# Patient Record
Sex: FEMALE | Race: WHITE
[De-identification: names, ages, dates, MRNs, and addresses within clinical notes are randomized per-mention and may not be internally consistent; named-entity substitution may affect disease eponyms.]

---

## 2019-08-18 ENCOUNTER — HOSPITAL ENCOUNTER (EMERGENCY)
Dept: HOSPITAL 74 - JERFT | Age: 49
Discharge: HOME | End: 2019-08-18
Payer: COMMERCIAL

## 2019-08-18 VITALS — HEART RATE: 96 BPM | SYSTOLIC BLOOD PRESSURE: 138 MMHG | DIASTOLIC BLOOD PRESSURE: 89 MMHG | TEMPERATURE: 98.4 F

## 2019-08-18 VITALS — BODY MASS INDEX: 31.8 KG/M2

## 2019-08-18 DIAGNOSIS — T31.0: ICD-10-CM
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
## 2019-08-18 DIAGNOSIS — T24.211A: Primary | ICD-10-CM

## 2019-08-18 DIAGNOSIS — T25.191A: ICD-10-CM

## 2019-08-18 PROCEDURE — 2W2LX4Z DRESSING OF RIGHT LOWER EXTREMITY USING BANDAGE: ICD-10-PCS

## 2019-08-18 PROCEDURE — 3E0234Z INTRODUCTION OF SERUM, TOXOID AND VACCINE INTO MUSCLE, PERCUTANEOUS APPROACH: ICD-10-PCS
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
## 2020-06-25 ENCOUNTER — RESULT REVIEW (OUTPATIENT)
Age: 50
End: 2020-06-25

## 2022-10-07 PROBLEM — Z00.00 ENCOUNTER FOR PREVENTIVE HEALTH EXAMINATION: Status: ACTIVE | Noted: 2022-10-07

## 2022-10-11 ENCOUNTER — NON-APPOINTMENT (OUTPATIENT)
Age: 52
End: 2022-10-11

## 2022-10-11 DIAGNOSIS — Z87.11 PERSONAL HISTORY OF PEPTIC ULCER DISEASE: ICD-10-CM

## 2022-10-11 DIAGNOSIS — Z87.39 PERSONAL HISTORY OF OTHER DISEASES OF THE MUSCULOSKELETAL SYSTEM AND CONNECTIVE TISSUE: ICD-10-CM

## 2022-10-11 DIAGNOSIS — Z86.79 PERSONAL HISTORY OF OTHER DISEASES OF THE CIRCULATORY SYSTEM: ICD-10-CM

## 2022-10-11 DIAGNOSIS — Z86.39 PERSONAL HISTORY OF OTHER ENDOCRINE, NUTRITIONAL AND METABOLIC DISEASE: ICD-10-CM

## 2022-10-11 RX ORDER — SIMVASTATIN 20 MG/1
20 TABLET, FILM COATED ORAL
Refills: 0 | Status: ACTIVE | COMMUNITY

## 2022-10-12 ENCOUNTER — APPOINTMENT (OUTPATIENT)
Dept: SURGERY | Facility: CLINIC | Age: 52
End: 2022-10-12

## 2022-10-12 VITALS
SYSTOLIC BLOOD PRESSURE: 137 MMHG | BODY MASS INDEX: 36.32 KG/M2 | RESPIRATION RATE: 18 BRPM | HEART RATE: 84 BPM | WEIGHT: 185 LBS | DIASTOLIC BLOOD PRESSURE: 94 MMHG | TEMPERATURE: 97.1 F | HEIGHT: 60 IN

## 2022-10-12 DIAGNOSIS — Z78.9 OTHER SPECIFIED HEALTH STATUS: ICD-10-CM

## 2022-10-12 DIAGNOSIS — E04.1 NONTOXIC SINGLE THYROID NODULE: ICD-10-CM

## 2022-10-12 DIAGNOSIS — Z83.49 FAMILY HISTORY OF OTHER ENDOCRINE, NUTRITIONAL AND METABOLIC DISEASES: ICD-10-CM

## 2022-10-12 PROCEDURE — 99204 OFFICE O/P NEW MOD 45 MIN: CPT

## 2022-10-12 NOTE — REVIEW OF SYSTEMS
[Feeling Tired] : feeling tired [As Noted in HPI] : as noted in HPI [Negative] : Psychiatric [Sore Throat] : no sore throat [Hoarseness] : no hoarseness [FreeTextEntry8] : h/o kidney stones [de-identified] : hair loss

## 2022-10-12 NOTE — ASSESSMENT
[FreeTextEntry1] : 51 yo F with two suspicious right thyroid isthmus nodules with FNA showing atypia and thyroseq with BRAF mutation conferring a  99% risk of thyroid malignancy.\par \par We had a long discussion about thyroid anatomy, physiology and pathophysiology. We discussed the role of fine needle aspiration biopsies, their interpretation and management. We discussed genny and total thyroidectomy. The patient prefers a total thyroidectomy. We discussed the benefits and potential risks of surgery including temporary and permanent hypoparathyroidism as well as temporary and permanent recurrent laryngeal nerve palsy, bleeding and infection. We discussed the use of intraoperative nerve monitoring. We also spoke about the need for lifelong thyroid replacement therapy postoperatively and temporary calcium supplementation postoperatively. WE discussed the possible need for HENSLEY postop as well as surveillance for recurrence with US and TG. We discussed the expected recovery.\par Surgery will be performed at [facility] under general anesthesia with nerve monitoring as an overnight procedure.\par The patient will require preoperative medical clearance.\par Tentatively planned for Thursday Oct 20, 2022.\par

## 2022-10-12 NOTE — PHYSICAL EXAM
[Respiratory Effort] : normal respiratory effort [Normal Rate and Rhythm] : normal rate and rhythm [No Rash or Lesion] : No rash or lesion [Alert] : alert [Calm] : calm [de-identified] : NAD, well-appearing [de-identified] : Anicteric [de-identified] : prominent thyroid isthmus with nodule palpable at right isthmus border on palpation; On bedside US, there is a hypoechoic smaller nodule and a larger adjacent hypoechoic nodule c/w prior US. There is a visible interface at the tracheal border with possible extension vs inflammatory reaction at overlying muscle focally; no suspicious lateral nodes. [de-identified] : soft, nondistended

## 2022-10-12 NOTE — CONSULT LETTER
[Dear  ___] : Dear  [unfilled], [Courtesy Letter:] : I had the pleasure of seeing your patient, [unfilled], in my office today. [Please see my note below.] : Please see my note below. [Consult Closing:] : Thank you very much for allowing me to participate in the care of this patient.  If you have any questions, please do not hesitate to contact me. [Sincerely,] : Sincerely, [FreeTextEntry3] : Jolynn Del Castillo MD

## 2022-10-12 NOTE — HISTORY OF PRESENT ILLNESS
[de-identified] : 51 yo F presents for second opinion regarding thyroid nodules. She was initially recommended to have a thyroid US about 1 year ago. She didn't end up having it at that time but at her yearly physical this year, did go ahead with the thyroid US. She was found to have multiple bilateral tiny 3-4mm cystic nodules as well as a hypoechoic isthmus nodule [id number] and a second adjacent hypoechoic right isthmus nodule 1.1x0.5x0.9cm. The larger nodule was recommended for FNA and this returned as atypia of undetermined significance, [employer]. Thyroseq from the sample was found to have BRAF [id number] mutation. No lymphadenopathy was noted on the US. She was seen by Dr. Jose Hewitt who discussed with her surgical options including right and total thyroidectomy.\par The patient denies any changes in her voice, dysphagia, shortness of breath or pressure on the neck. Her mother and sister have thyroid issues (hypothyroidism, benign nodule) but no history of thyroid cancer. Her maternal aunt did have thyroid cancer. She denies any history of radiation exposure. She is not a professional cooley.\par Of note, she has a history of calcium/uric acid kidney stones. She was never known to have elevated calcium. Her TSH is normal.

## 2022-10-19 ENCOUNTER — NON-APPOINTMENT (OUTPATIENT)
Age: 52
End: 2022-10-19

## 2022-10-20 ENCOUNTER — RESULT REVIEW (OUTPATIENT)
Age: 52
End: 2022-10-20

## 2022-10-20 ENCOUNTER — APPOINTMENT (OUTPATIENT)
Dept: SURGERY | Facility: HOSPITAL | Age: 52
End: 2022-10-20

## 2022-10-21 ENCOUNTER — RESULT REVIEW (OUTPATIENT)
Age: 52
End: 2022-10-21

## 2022-11-02 ENCOUNTER — APPOINTMENT (OUTPATIENT)
Dept: SURGERY | Facility: CLINIC | Age: 52
End: 2022-11-02

## 2022-11-02 ENCOUNTER — NON-APPOINTMENT (OUTPATIENT)
Age: 52
End: 2022-11-02

## 2022-11-02 VITALS
BODY MASS INDEX: 36.32 KG/M2 | WEIGHT: 185 LBS | SYSTOLIC BLOOD PRESSURE: 142 MMHG | DIASTOLIC BLOOD PRESSURE: 92 MMHG | TEMPERATURE: 98.2 F | HEART RATE: 80 BPM | HEIGHT: 60 IN

## 2022-11-02 DIAGNOSIS — C73 MALIGNANT NEOPLASM OF THYROID GLAND: ICD-10-CM

## 2022-11-02 PROCEDURE — 99024 POSTOP FOLLOW-UP VISIT: CPT

## 2022-11-02 RX ORDER — LISINOPRIL AND HYDROCHLOROTHIAZIDE TABLETS 10; 12.5 MG/1; MG/1
TABLET ORAL
Refills: 0 | Status: ACTIVE | COMMUNITY

## 2022-11-02 RX ORDER — LEVOTHYROXINE SODIUM 0.15 MG/1
150 TABLET ORAL
Qty: 30 | Refills: 0 | Status: ACTIVE | COMMUNITY
Start: 2022-10-21

## 2022-11-04 LAB
ANION GAP SERPL CALC-SCNC: 11 MMOL/L
BUN SERPL-MCNC: 15 MG/DL
CALCIUM SERPL-MCNC: 8.6 MG/DL
CALCIUM SERPL-MCNC: 8.6 MG/DL
CHLORIDE SERPL-SCNC: 102 MMOL/L
CO2 SERPL-SCNC: 27 MMOL/L
CREAT SERPL-MCNC: 0.84 MG/DL
EGFR: 85 ML/MIN/1.73M2
GLUCOSE SERPL-MCNC: 101 MG/DL
PARATHYROID HORMONE INTACT: 33 PG/ML
POTASSIUM SERPL-SCNC: 4.1 MMOL/L
SODIUM SERPL-SCNC: 140 MMOL/L

## 2022-11-04 NOTE — HISTORY OF PRESENT ILLNESS
[de-identified] : Patient returns for follow up s/p total thyroidectomy and parathyroid autotransplantation. She is doing well with minimal pain, no voice complaints, no incisional concerns. She is feeling fatigued. She had some tingling last week but this improved with calcium and she is no longer having those symptoms. We discussed her pathology in detail: 1.2cm classical PTC without LVI, ETE and negative margins; 1/5 LNs with 0.5mm micrometastasis.

## 2022-11-04 NOTE — ASSESSMENT
[FreeTextEntry1] : 51 yo F s;p total thyroidectomy for BRAF-positive PTC.\par We discussed her pathology and risk of recurrence. We discussed HENSLEY and surveillance strategy. I believe she is at low risk for recurrence given ONIEL guidelines.\par Will check PTH/Ca today.\par I have asked her to see an endocrinologist and offered several names for referral.\par She should see an endocrinologist in ~ 4 weeks to check TSH, TG, TG ab and repeat PTH/Ca, as well as discuss whether HENSLEY is indicated.\par If she is unable to get to an endocrinologist, I advised to return to me for the above bloodwork.\par Otherwise, she should see me in 3-4 months.

## 2022-11-04 NOTE — REVIEW OF SYSTEMS
[As Noted in HPI] : as noted in HPI [Feeling Tired] : feeling tired [Negative] : Gastrointestinal [Sore Throat] : no sore throat [Hoarseness] : no hoarseness

## 2022-11-04 NOTE — PHYSICAL EXAM
[Respiratory Effort] : normal respiratory effort [Normal Rate and Rhythm] : normal rate and rhythm [No Rash or Lesion] : No rash or lesion [Alert] : alert [Calm] : calm [de-identified] : NAD, well-appearing [de-identified] : W [de-identified] : Well-healing thyroidectomy incision c.d.i with minimal residual edema and erythema. Negative Chvostek's sign; Speaking in  a normal voice

## 2022-11-04 NOTE — CONSULT LETTER
[Dear  ___] : Dear  [unfilled], [Courtesy Letter:] : I had the pleasure of seeing your patient, [unfilled], in my office today. [Consult Closing:] : Thank you very much for allowing me to participate in the care of this patient.  If you have any questions, please do not hesitate to contact me. [Sincerely,] : Sincerely, [FreeTextEntry1] : Enclosed please find my operative report, pathology report and postop visit note.  [FreeTextEntry3] : Jolynn Del Castillo MD

## 2022-11-22 ENCOUNTER — TRANSCRIPTION ENCOUNTER (OUTPATIENT)
Age: 52
End: 2022-11-22

## 2023-01-11 ENCOUNTER — NON-APPOINTMENT (OUTPATIENT)
Age: 53
End: 2023-01-11

## 2023-10-28 ENCOUNTER — NON-APPOINTMENT (OUTPATIENT)
Age: 53
End: 2023-10-28

## 2025-05-22 ENCOUNTER — NON-APPOINTMENT (OUTPATIENT)
Age: 55
End: 2025-05-22